# Patient Record
Sex: MALE | Race: WHITE | Employment: OTHER | URBAN - METROPOLITAN AREA
[De-identification: names, ages, dates, MRNs, and addresses within clinical notes are randomized per-mention and may not be internally consistent; named-entity substitution may affect disease eponyms.]

---

## 2019-05-25 PROCEDURE — 81003 URINALYSIS AUTO W/O SCOPE: CPT | Performed by: FAMILY MEDICINE

## 2019-05-25 PROCEDURE — 86803 HEPATITIS C AB TEST: CPT | Performed by: FAMILY MEDICINE

## 2020-11-14 ENCOUNTER — HOSPITAL ENCOUNTER (EMERGENCY)
Age: 60
Discharge: HOME | End: 2020-11-14
Attending: EMERGENCY MEDICINE
Payer: COMMERCIAL

## 2020-11-14 VITALS
HEART RATE: 93 BPM | DIASTOLIC BLOOD PRESSURE: 78 MMHG | BODY MASS INDEX: 25.73 KG/M2 | SYSTOLIC BLOOD PRESSURE: 135 MMHG | HEIGHT: 72 IN | WEIGHT: 190 LBS | OXYGEN SATURATION: 98 % | RESPIRATION RATE: 18 BRPM | TEMPERATURE: 98 F

## 2020-11-14 DIAGNOSIS — K08.89 TOOTHACHE: Primary | ICD-10-CM

## 2020-11-14 PROCEDURE — 99283 EMERGENCY DEPT VISIT LOW MDM: CPT

## 2020-11-14 RX ORDER — IBUPROFEN 600 MG/1
600 TABLET ORAL ONCE
Status: COMPLETED | OUTPATIENT
Start: 2020-11-14 | End: 2020-11-14

## 2020-11-14 RX ORDER — TRAMADOL HYDROCHLORIDE 50 MG/1
50 TABLET ORAL EVERY 8 HOURS PRN
Qty: 10 TABLET | Refills: 0 | Status: SHIPPED | OUTPATIENT
Start: 2020-11-14 | End: 2020-11-21

## 2020-11-14 RX ORDER — CLINDAMYCIN HYDROCHLORIDE 300 MG/1
300 CAPSULE ORAL 4 TIMES DAILY
Qty: 28 CAPSULE | Refills: 0 | Status: SHIPPED | OUTPATIENT
Start: 2020-11-14 | End: 2020-11-21

## 2020-11-14 NOTE — ED PROVIDER NOTES
Patient Seen in: [facility] Emergency Department In [place]      History   Patient presents with:  Dental Problem    Stated Complaint: TOOTH ABSCESS    HPI    Patient is a 27-year-old male present emergency room with chronic left upper dental pain.   [place] Cardiovascular: Negative. Gastrointestinal: Negative. Genitourinary: Negative. Musculoskeletal: Negative. Skin: Negative. Neurological: Negative. Psychiatric/Behavioral: Negative.         Positive for stated complaint: [address] Normal rate and regular rhythm. Pulses: Normal pulses. Heart sounds: Normal heart sounds. Pulmonary:      Effort: Pulmonary effort is normal.      Breath sounds: Normal breath sounds. Chest:      Chest wall: No tenderness.    Abdominal:      G Impression:  Toothache  (primary encounter diagnosis)    Disposition:  Discharge  11/14/2020  2:54 pm    Follow-up:  Benigno Astudillo, [address] [facility] [phone]    Call in 1 day  As needed, If symptoms worsen    [place],

## 2021-02-06 ENCOUNTER — HOSPITAL ENCOUNTER (OUTPATIENT)
Age: 61
Discharge: HOME | End: 2021-02-06
Attending: EMERGENCY MEDICINE
Payer: COMMERCIAL

## 2021-02-06 VITALS
DIASTOLIC BLOOD PRESSURE: 85 MMHG | BODY MASS INDEX: 25.18 KG/M2 | TEMPERATURE: 99 F | HEIGHT: 73 IN | SYSTOLIC BLOOD PRESSURE: 141 MMHG | OXYGEN SATURATION: 99 % | RESPIRATION RATE: 18 BRPM | HEART RATE: 101 BPM | WEIGHT: 190 LBS

## 2021-02-06 DIAGNOSIS — L30.9 DERMATITIS: Primary | ICD-10-CM

## 2021-02-06 PROCEDURE — 99213 OFFICE O/P EST LOW 20 MIN: CPT

## 2021-02-06 NOTE — ED INITIAL ASSESSMENT (HPI)
Pt here c/o rash to bilateral rib area, and lt upper leg. Noticed the rash about 2 months ago. States rash is painful and itchy.

## 2021-02-06 NOTE — ED PROVIDER NOTES
Patient Seen in: [facility] [place]      History   Patient presents with:  Rash Skin Problem    Stated Complaint: Neurological complaints/painful rash    HPI/Subjective:   HPI    66-year-old male presents to the emergency department with complain I do not appreciate any pustular lesions or vesicular lesions. ED Course   Labs Reviewed - No data to display       Unclear etiology of patient's rash. I currently do not appreciate any rash in his left axilla.   Will question the possibility of shin

## 2021-11-07 ENCOUNTER — APPOINTMENT (OUTPATIENT)
Dept: GENERAL RADIOLOGY | Age: 61
End: 2021-11-07
Attending: NURSE PRACTITIONER
Payer: COMMERCIAL

## 2021-11-07 ENCOUNTER — APPOINTMENT (OUTPATIENT)
Dept: CT IMAGING | Facility: HOSPITAL | Age: 61
End: 2021-11-07
Attending: EMERGENCY MEDICINE
Payer: COMMERCIAL

## 2021-11-07 ENCOUNTER — HOSPITAL ENCOUNTER (OUTPATIENT)
Age: 61
Discharge: LEFT BEFORE DISCHARGE | End: 2021-11-07
Payer: COMMERCIAL

## 2021-11-07 ENCOUNTER — OFFICE VISIT (OUTPATIENT)
Dept: FAMILY MEDICINE CLINIC | Facility: CLINIC | Age: 61
End: 2021-11-07
Payer: COMMERCIAL

## 2021-11-07 ENCOUNTER — HOSPITAL ENCOUNTER (EMERGENCY)
Facility: HOSPITAL | Age: 61
Discharge: HOME | End: 2021-11-08
Attending: EMERGENCY MEDICINE
Payer: COMMERCIAL

## 2021-11-07 VITALS
OXYGEN SATURATION: 91 % | WEIGHT: 185 LBS | DIASTOLIC BLOOD PRESSURE: 78 MMHG | RESPIRATION RATE: 20 BRPM | HEART RATE: 128 BPM | TEMPERATURE: 100 F | BODY MASS INDEX: 24.52 KG/M2 | HEIGHT: 73 IN | SYSTOLIC BLOOD PRESSURE: 136 MMHG

## 2021-11-07 VITALS
RESPIRATION RATE: 20 BRPM | OXYGEN SATURATION: 98 % | SYSTOLIC BLOOD PRESSURE: 120 MMHG | DIASTOLIC BLOOD PRESSURE: 82 MMHG | TEMPERATURE: 98 F | HEART RATE: 120 BPM

## 2021-11-07 DIAGNOSIS — R06.02 SHORTNESS OF BREATH: ICD-10-CM

## 2021-11-07 DIAGNOSIS — R00.0 SINUS TACHYCARDIA: ICD-10-CM

## 2021-11-07 DIAGNOSIS — Z02.9 ADMINISTRATIVE ENCOUNTER: Primary | ICD-10-CM

## 2021-11-07 DIAGNOSIS — U07.1 COVID-19 VIRUS INFECTION: Primary | ICD-10-CM

## 2021-11-07 PROCEDURE — 99214 OFFICE O/P EST MOD 30 MIN: CPT

## 2021-11-07 PROCEDURE — 80053 COMPREHEN METABOLIC PANEL: CPT

## 2021-11-07 PROCEDURE — 80053 COMPREHEN METABOLIC PANEL: CPT | Performed by: EMERGENCY MEDICINE

## 2021-11-07 PROCEDURE — 99285 EMERGENCY DEPT VISIT HI MDM: CPT

## 2021-11-07 PROCEDURE — 71046 X-RAY EXAM CHEST 2 VIEWS: CPT | Performed by: NURSE PRACTITIONER

## 2021-11-07 PROCEDURE — 96361 HYDRATE IV INFUSION ADD-ON: CPT

## 2021-11-07 PROCEDURE — 84145 PROCALCITONIN (PCT): CPT | Performed by: EMERGENCY MEDICINE

## 2021-11-07 PROCEDURE — 85379 FIBRIN DEGRADATION QUANT: CPT | Performed by: EMERGENCY MEDICINE

## 2021-11-07 PROCEDURE — 85025 COMPLETE CBC W/AUTO DIFF WBC: CPT

## 2021-11-07 PROCEDURE — 93010 ELECTROCARDIOGRAM REPORT: CPT

## 2021-11-07 PROCEDURE — 96360 HYDRATION IV INFUSION INIT: CPT

## 2021-11-07 PROCEDURE — 71275 CT ANGIOGRAPHY CHEST: CPT | Performed by: EMERGENCY MEDICINE

## 2021-11-07 PROCEDURE — 93005 ELECTROCARDIOGRAM TRACING: CPT

## 2021-11-07 PROCEDURE — 84484 ASSAY OF TROPONIN QUANT: CPT | Performed by: EMERGENCY MEDICINE

## 2021-11-07 PROCEDURE — 85025 COMPLETE CBC W/AUTO DIFF WBC: CPT | Performed by: EMERGENCY MEDICINE

## 2021-11-07 PROCEDURE — 99453 REM MNTR PHYSIOL PARAM SETUP: CPT

## 2021-11-07 RX ORDER — SODIUM CHLORIDE 9 MG/ML
125 INJECTION, SOLUTION INTRAVENOUS CONTINUOUS
Status: DISCONTINUED | OUTPATIENT
Start: 2021-11-07 | End: 2021-11-08

## 2021-11-07 RX ORDER — BENZONATATE 100 MG/1
100 CAPSULE ORAL 3 TIMES DAILY PRN
Qty: 30 CAPSULE | Refills: 0 | Status: SHIPPED | OUTPATIENT
Start: 2021-11-07 | End: 2021-12-07

## 2021-11-07 RX ORDER — POTASSIUM CHLORIDE 20 MEQ/1
40 TABLET, EXTENDED RELEASE ORAL ONCE
Status: COMPLETED | OUTPATIENT
Start: 2021-11-07 | End: 2021-11-07

## 2021-11-07 NOTE — ED PROVIDER NOTES
Patient Seen in: [facility] [place]      History   Patient presents with:  Cough/URI    Stated Complaint: TL- cough, dyspnea 1 mo    Subjective:   HPI  Patient is a 35-year-old male with past medical history of allergic rhinitis, GERD, headaches, All other systems reviewed and negative except as noted above.     Physical Exam     ED Triage Vitals [11/07/21 1451]   /78   Pulse (!) 128   Resp 20   Temp 99.5 °F (37.5 °C)   Temp src Temporal   SpO2 91 %   O2 Device None (Room air)       Radha Value    Rapid SARS-CoV-2 by PCR Detected (*)     All other components within normal limits          EKG; rate, intervals, axis per EKG interpretation.   Sinus tachycardia with ventricular rate 119 bpm.  There is no ST segment elevation or depression or oth Registry) which includes the Dose Index Registry.   PATIENT STATED HISTORY:(As transcribed by Technologist)  Covid positive, weakness, SOB and cough   CONTRAST USED:  90cc of Omnipaque 350  FINDINGS:  VASCULATURE:  No visible pulmonary arterial thrombus or further evaluation. Patient declined stating that he needs to go home and pay bills first and he will either go to the [facility] or tomorrow.   I discussed with patient that his condition may deteriorate and may result in deterioration of his condition in

## 2021-11-07 NOTE — ED INITIAL ASSESSMENT (HPI)
Pt aox4. Pt c/o was treated for sinus infection 1 month ago per Dr. Mariama Alarcon. Pt was given biaxin and patient completed treatment. Pt c/o cough, body aches, pain in throat with cough, fever intermittently x 2 weeks.

## 2021-11-07 NOTE — ED QUICK NOTES
Bre MATA at bedside instructed patient to go directly to the ER. Pt states \" I need to go home and write some check for bills. I will go today or tomorrow. I have 1 month to move out of my apartment. \" Rn discussed Incentive spirometer, prescrip

## 2021-11-07 NOTE — PROGRESS NOTES
Pt presented with URI symptoms for over 1 month. Was treated for sinusitis about 1 month ago, but never felt like he truly recovered. Now reports worsening cough with mild dyspnea and fatigue. Lungs diminished. O2 sat 98%.   Patient is not vaccinated ag

## 2021-11-08 VITALS
TEMPERATURE: 100 F | OXYGEN SATURATION: 95 % | HEIGHT: 73 IN | BODY MASS INDEX: 24.52 KG/M2 | RESPIRATION RATE: 25 BRPM | WEIGHT: 185 LBS | HEART RATE: 106 BPM | SYSTOLIC BLOOD PRESSURE: 117 MMHG | DIASTOLIC BLOOD PRESSURE: 72 MMHG

## 2021-11-08 PROCEDURE — 93005 ELECTROCARDIOGRAM TRACING: CPT

## 2021-11-08 NOTE — ED NOTES
Patient taken over from Dr. [employer]re [employer] pending patient to finish monoclonal antibodies for Covid. There is no current issues.   Patient will be discharged home following his infusion

## 2021-11-08 NOTE — ED PROVIDER NOTES
Patient Seen in: [facility] Emergency Department      History   Patient presents with:  Covid    Stated Complaint: recently diagnosed with covid at immediate care and told to go to ER for blood *    Subjective:   HPI    Patient is a 60-year-old male 95%   BMI 24.41 kg/m²         Physical Exam  GENERAL: Patient resting  on the cart in no acute distress. HEENT: Extraocular muscles intact, pupils equal round reactive to light, neck supple, no meningismus. LUNGS: Lungs clear to auscultation bilaterally. Please view results for these tests on the individual orders. RAINBOW DRAW BLUE   RAINBOW DRAW LAVENDER   RAINBOW DRAW LIGHT GREEN   RAINBOW DRAW GOLD   CBC W/ DIFFERENTIAL     EKG    Rate, intervals and axes as noted on EKG Report.   Rate: 116  Rhyth disposition on file for this visit. There is no disposition time on file for this visit.     Follow-up:  Pedro Pablo Bray, [address] [facility] [address] [place] [phone]    In 2 days        The patient has evidence of COVID-19 pneumonia and